# Patient Record
Sex: FEMALE | Race: WHITE
[De-identification: names, ages, dates, MRNs, and addresses within clinical notes are randomized per-mention and may not be internally consistent; named-entity substitution may affect disease eponyms.]

---

## 2018-12-13 ENCOUNTER — HOSPITAL ENCOUNTER (EMERGENCY)
Dept: HOSPITAL 62 - ER | Age: 48
LOS: 1 days | Discharge: HOME | End: 2018-12-14
Payer: COMMERCIAL

## 2018-12-13 DIAGNOSIS — Z91.128: ICD-10-CM

## 2018-12-13 DIAGNOSIS — Z86.69: ICD-10-CM

## 2018-12-13 DIAGNOSIS — T43.626A: ICD-10-CM

## 2018-12-13 DIAGNOSIS — I10: ICD-10-CM

## 2018-12-13 DIAGNOSIS — F90.9: ICD-10-CM

## 2018-12-13 DIAGNOSIS — R51: Primary | ICD-10-CM

## 2018-12-13 DIAGNOSIS — Z91.14: ICD-10-CM

## 2018-12-13 PROCEDURE — 96374 THER/PROPH/DIAG INJ IV PUSH: CPT

## 2018-12-13 PROCEDURE — 96375 TX/PRO/DX INJ NEW DRUG ADDON: CPT

## 2018-12-13 PROCEDURE — 70450 CT HEAD/BRAIN W/O DYE: CPT

## 2018-12-13 PROCEDURE — 99284 EMERGENCY DEPT VISIT MOD MDM: CPT

## 2018-12-13 NOTE — ER DOCUMENT REPORT
ED Medical Screen (RME)





- General


Chief Complaint: Headache


Stated Complaint: HEADACHE


Time Seen by Provider: 18 21:31


Notes: 





48-year-old female with chief complaint of headache and elevated blood pressure 

readings.  States she has noted to have high blood pressure the past 2 days 

with her provider, states she woke up with a bad headache over the top of her 

head and has had this headache all day.  Mild photophobia.  No nausea or 

vomiting.  States it does not feel the same as her typical migraines, but 

denies that it is worse.  Denies fever, neck pain, injury.  Takes gabapentin 

for migraines reportedly, states she takes Topamax as a mood stabilizer, states 

she took her daughter's nifedipine earlier today.


TRAVEL OUTSIDE OF THE [place] IN LAST 30 DAYS: No





- Related Data


Allergies/Adverse Reactions: 


 





morphine [Morphine] Allergy (Verified 16 11:09)


 Nausea











Past Medical History





- Past Medical History


Cardiac Medical History: 


   Denies: Hx Coronary Artery Disease, Hx Heart Attack, Hx Hypertension


Pulmonary Medical History: 


   Denies: Hx Asthma, Hx Bronchitis, Hx COPD, Hx Pneumonia, Hx Tuberculosis


Neurological Medical History: Denies: Hx Cerebrovascular Accident, Hx Seizures


Musculoskeltal Medical History: Reports Hx Arthritis - hands


Psychiatric Medical History: Reports: Hx Depression - since father 


Past Surgical History: Reports: Hx  Section, Hx Hysterectomy, Hx 

Orthopedic Surgery - ankle, back, wrist





- Immunizations


Hx Diphtheria, Pertussis, Tetanus Vaccination: No





Physical Exam





- Vital signs


Vitals: 





 











Temp Pulse Resp BP Pulse Ox


 


 98.4 F   112 H  18   170/103 H  97 


 


 18 20:34  18 20:34  18 20:34  18 20:34  18 20:34














- General


General appearance: Anxious - Patient mildly anxious with rapid speech but is 

otherwise well-appearing


In distress: None





- Neurological


Orientation: AAOx4


Pierron Coma Scale Eye Opening: Spontaneous


Louis Coma Scale Verbal: Oriented


Louis Coma Scale Motor: Obeys Commands


Louis Coma Scale Total: 15


Speech: Normal


Cranial nerves: Normal


Cerebellar coordination: No: Gait ataxia


Motor strength normal: LUE, RUE, LLE, RLE





Course





- Vital Signs


Vital signs: 





 











Temp Pulse Resp BP Pulse Ox


 


 98.4 F   112 H  18   170/103 H  97 


 


 18 20:34  18 20:34  18 20:34  18 20:34  18 20:34














Doctor's Discharge





- Discharge


Referrals: 


ENIO COLLAZO MD [Primary Care Provider] - Follow up as needed

## 2018-12-13 NOTE — ER DOCUMENT REPORT
ED General





- General


Chief Complaint: Headache


Stated Complaint: HEADACHE


Time Seen by Provider: 18 21:31


Notes: 


Patient is a 48-year-old female presents with complaint of a headache.  She 

says that she woke up with a headache.  Headache is a tightness over the 

temporal aspects of her head.  She denies any neck pain.  No neck stiffness.  

No fevers.  No vomiting.  No photophobia.  She does have a history of chronic 

migraines.  She says this headache is different from her migraines and that 

with her migraines usually has a lot of vomiting.  Patient says her blood 

pressure is also been high now for a week.  Her blood pressure started getting 

high after she ran out of her Adderall.  She saw her psychiatrist, Dr. Winters

, who did not want to re-prescribe her Adderall but since her blood pressure 

was high after she ran out.  He want her seen by medical doctor to make sure is 

okay for her to go back on Adderall.  No recent fevers or infections.  No 

vomiting.  No focal weakness or numbness.  No chest pain or shortness of 

breath.  No other complaints at this time.





TRAVEL OUTSIDE OF THE [place] IN LAST 30 DAYS: No





- Related Data


Allergies/Adverse Reactions: 


 





morphine [Morphine] Allergy (Verified 16 11:09)


 Nausea











Past Medical History





- Social History


Smoking Status: Never Smoker


Frequency of alcohol use: None


Drug Abuse: None


Family History: Reviewed & Not Pertinent





- Past Medical History


Cardiac Medical History: 


   Denies: Hx Coronary Artery Disease, Hx Heart Attack, Hx Hypertension


Pulmonary Medical History: 


   Denies: Hx Asthma, Hx Bronchitis, Hx COPD, Hx Pneumonia, Hx Tuberculosis


Neurological Medical History: Denies: Hx Cerebrovascular Accident, Hx Seizures


Musculoskeletal Medical History: Reports Hx Arthritis - hands


Psychiatric Medical History: Reports: Hx Depression - since father 


Past Surgical History: Reports: Hx  Section, Hx Hysterectomy, Hx 

Orthopedic Surgery - ankle, back, wrist





- Immunizations


Hx Diphtheria, Pertussis, Tetanus Vaccination: No





Review of Systems





- Review of Systems


Notes: 





My Normal Review Basic





REVIEW OF SYSTEMS:


CONSTITUTIONAL :  Denies fever,  chills, or sweats.  Denies recent illness.


EENT:   Denies eye, ear, throat, or mouth pain or symptoms.  Denies nasal or 

sinus congestion.


CARDIOVASCULAR:  Denies chest pain.


RESPIRATORY:  Denies cough, cold, or chest congestion.  Denies shortness of 

breath, difficulty breathing, or wheezing.


GASTROINTESTINAL:  Denies abdominal pain.  Denies nausea, vomiting, or 

diarrhea.  


GENITOURINARY:  Denies difficulty urinating, painful urination, burning, 

frequency, or blood in urine.


MUSCULOSKELETAL:  Denies neck or back pain or joint pain or swelling.


SKIN:   Denies rash or skin lesions.


NEUROLOGICAL:  Denies altered mental status or loss of consciousness.  Has a 

headache.  Denies weakness or paralysis or loss of use of either side.  Denies 

problems with gait or speech.  Denies sensory or motor loss.


PSYCHIATRIC: Has history of ADHD.  Usually takes 50 mg of Adderall in the 

morning and 30 mg in the afternoon.


ALL OTHER SYSTEMS REVIEWED AND NEGATIVE.





Physical Exam





- Vital signs


Vitals: 


 











Temp Pulse Resp BP Pulse Ox


 


 98.4 F   112 H  18   170/103 H  97 


 


 18 20:34  18 20:34  18 20:34  18 20:34  18 20:34














- Notes


Notes: 





General Appearance: Well nourished, alert, cooperative, no acute distress, no 

obvious discomfort.  Well-appearing


Vitals: reviewed, See vital signs table.


Head: no swelling or tenderness to the head


Eyes: PERRL, EOMI, Conjuctiva clear


Mouth: No decreasd moisture


Neck: Supple, no neck tenderness, No thyromegaly


Lungs: No wheezing, No rales, No rhonci, No accessory muscle use, good air 

exchange bilaterally.


Heart: Normal rate, Regular rythm, No murmur, no rub


Extremities: strength 5/5 in all extremities, good pulses in all extremities, 

no swelling or tenderness in the extremities, no edema.


Skin: warm, dry, appropriate color, no rash


Neuro: speech clear, oriented x 3, normal affect, responds appropriately to 

questions.  Cranial nerves II through XII are intact.  Distal sensation intact.

  Patient moves all extremities without difficulty.  Distal sensation intact.





Course





- Re-evaluation


Re-evalutation: 





18 01:48


Patient says she still had headache after the Benadryl and Reglan.  I therefore 

gave her Toradol and Decadron is that this is helped significantly however she 

still has some residual headache.  I asked her about morphine as its in her 

allergy list.  Patient says she is never been allergic to morphine and is 

unsure what sent her allergy list.  I will give her a very small dose of 

Dilaudid to see if this takes away the remainder of her headache.


18 05:37








Patient was anxious but not seem to have a lot of pain.  She is awake and 

alert.  She is no photophobia.  She was acting appropriately except for a 

little bit anxious.  I suspect her high blood pressure and headache are a 

result of her stopping her Adderall and likely having withdrawal from not being 

on this.  She is actually on fairly consistent dosages as she takes it twice a 

day and takes it regularly.  Her high blood pressure did not start until after 

she ran out of her medication and of course her blood pressure continued to 

gradually truly worsen and then she developed a headache today.  Thinks 

unlikely she has subarachnoid hemorrhage.  CT scan was ordered by triage 

provider.  This was negative.  Patient does not have any neck pain.  She does 

not have any posterior head pain.  She has no neck stiffness.  She has no focal 

neurologic deficits.  Headache presentation is not really consistent with that 

of subarachnoid hemorrhage.  I feel the patient is safe to be discharged home 

at this time.  I represcribed her Adderall for her.  Her blood pressure 

improved after treatment here.  She will be placed back on her Adderall.  I 

informed her that if she still has recurrent headaches or still has recurrent 

high blood pressure after restarting her Adderall then she should return to the 

ER for reevaluation.  Patient agrees with plan will be discharged home.





Dictation of this chart was performed using voice recognition software; 

therefore, there may be some unintended grammatical errors.





- Vital Signs


Vital signs: 


 











Temp Pulse Resp BP Pulse Ox


 


 98.3 F   73   16   136/83 H  99 


 


 18 04:04  18 04:04  18 04:04  18 04:04  18 04:04














Discharge





- Discharge


Clinical Impression: 


Headache


Qualifiers:


 Headache type: unspecified Headache chronicity pattern: unspecified pattern 

Intractability: not intractable Qualified Code(s): R51 - Headache





Condition: Good


Disposition: HOME, SELF-CARE


Additional Instructions: 


Please take your medications as prescribed. I suspect your high blood pressure 

is a result of a stress response from your body due to being off your adderall. 

please start taking your Adderall again. Please return to the ER fore 

reevaluation if you continue to have recurrent headaches and continue to have 

high blood pressure despite being back on your Adderall. Use the combination of 

the 30mg and 20mg Adderall tablets to take 50mg in the morning and 30mg in the 

afternoon.


Prescriptions: 


Dextroamphetamine/Amphetamine [Adderall 20 mg Tablet] 20 mg PO ASDIR #7 tablet


Dextroamphetamine/Amphetamine [Adderall 30 mg Tablet] 30 mg PO ASDIR PRN #[id number] 

tablet


 PRN Reason: 


Forms:  Return to Work


Referrals: 


ENIO COLLAZO MD [[facility]] - 18

## 2018-12-13 NOTE — RADIOLOGY REPORT (SQ)
EXAM DESCRIPTION: 



CT HEAD WITHOUT IV CONTRAST



COMPLETED DATE/TME:  12/13/2018 21:31



CLINICAL HISTORY: 



48 years, Female, headache, hypertension



COMPARISON:

None.



TECHNIQUE:

200  Images stored on PACS.

 

All CT scanners at this facility use dose modulation, iterative

reconstruction, and/or weight based dosing when appropriate to

reduce radiation dose to as low as reasonably achievable (ALARA).





[facility]: Dose Right [facility]: CareDose   [facility]: Dose Right    [facility]:

Teradose 4D    [facility]: Smart Technologies



LIMITATIONS:

None.



FINDINGS:



The globes are intact. The paranasal sinuses show mild mucosal

thickening of the maxillary sinuses bilaterally. No displaced or

depressed skull fracture. No intra or extra-axial hemorrhage. CT

is limited for evaluation of acute infarct. No CT evidence for

large or territorial acute infarct. No mass or midline shift.





IMPRESSION:



Negative for acute intracranial abnormality

 

TECHNICAL DOCUMENTATION:



Quality ID # [id number]: Final reports with documentation of one or more

dose reduction techniques (e.g., Automated exposure control,

adjustment of the mA and/or kV according to patient size, use of

iterative reconstruction technique)



copyright 2011 [employer]- All Rights Reserved

## 2018-12-14 VITALS — SYSTOLIC BLOOD PRESSURE: 136 MMHG | DIASTOLIC BLOOD PRESSURE: 83 MMHG
